# Patient Record
Sex: MALE | HISPANIC OR LATINO | URBAN - METROPOLITAN AREA
[De-identification: names, ages, dates, MRNs, and addresses within clinical notes are randomized per-mention and may not be internally consistent; named-entity substitution may affect disease eponyms.]

---

## 2017-05-19 ENCOUNTER — EMERGENCY (EMERGENCY)
Facility: HOSPITAL | Age: 22
LOS: 0 days | Discharge: HOME | End: 2017-05-19
Attending: EMERGENCY MEDICINE | Admitting: EMERGENCY MEDICINE
Payer: SELF-PAY

## 2017-05-19 VITALS
DIASTOLIC BLOOD PRESSURE: 78 MMHG | RESPIRATION RATE: 18 BRPM | HEART RATE: 78 BPM | HEIGHT: 72 IN | TEMPERATURE: 98 F | WEIGHT: 179.9 LBS | SYSTOLIC BLOOD PRESSURE: 118 MMHG | OXYGEN SATURATION: 100 %

## 2017-05-19 VITALS
SYSTOLIC BLOOD PRESSURE: 120 MMHG | OXYGEN SATURATION: 100 % | HEART RATE: 74 BPM | TEMPERATURE: 98 F | DIASTOLIC BLOOD PRESSURE: 72 MMHG | RESPIRATION RATE: 18 BRPM
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
## 2017-05-19 DIAGNOSIS — S51.812A LACERATION WITHOUT FOREIGN BODY OF LEFT FOREARM, INITIAL ENCOUNTER: ICD-10-CM

## 2017-05-19 PROCEDURE — 99284 EMERGENCY DEPT VISIT MOD MDM: CPT

## 2017-05-19 PROCEDURE — 12001 RPR S/N/AX/GEN/TRNK 2.5CM/<: CPT

## 2017-05-19 NOTE — ED PROVIDER NOTE - MEDICAL DECISION MAKING DETAILS
Phyllis MELENDEZ: CANDE Barrios repaired the laceration on the patient's forearm. No neurological deficits post repair. Patient in no distress. Outpatient follow up and instructions to return if any health concerns provided for patient.

## 2017-05-19 NOTE — ED PROVIDER NOTE - SKIN, MLM
Skin normal color for race, warm. There is a 2 cm linear laceration in the left forearm with no dehiscence and no active bleeding. Bilateral radial and ulnar pulses present (2+). 5/5 strength in flexion and extension of bilateral upper and lower extremities including no neurological or muscular deficit in fine movements of the left hand and fingers. No evidence of tendon injury.

## 2017-05-19 NOTE — ED PROCEDURE NOTE - PROCEDURE ADDITIONAL DETAILS
Left wrist: Volar aspect , Lido 2 % infiltrated, irrigated copiously with saline, sutured with 4-0 prolene # 3 ,bacitracin ointment and DSD applied. Francis NP

## 2017-05-19 NOTE — ED PROVIDER NOTE - NEUROLOGICAL, MLM
Alert and oriented, no focal deficits, no motor or sensory deficits. CNs 2-12 intact. Non ataxic gait. EOMI. AAOx4.

## 2017-05-19 NOTE — ED PROVIDER NOTE - OBJECTIVE STATEMENT
21 year old male with no known pertinent PMH presents BIB EMS with cc of laceration to the left distal forearm after handling shingles. No fever or chills. No melena or hematochezia. No dysuria hematuria or frequency. No recent exotic travel. No IVDU. No ETOH abuse. No visual complaints. No focal neurological deficits. Patient with his friend who wrapped the laceration site.

## 2017-05-19 NOTE — ED PROVIDER NOTE - ATTENDING CONTRIBUTION TO CARE
I Justo Ferguson MD saw and examined this patient by myself. I asked NP Ms. Shanika Barrios to help me with repair of the laceration. I supervised her and agree with her care of the patient.

## 2017-09-26 NOTE — ED ADULT NURSE NOTE - GASTROINTESTINAL WDL
Abdomen soft, nontender, nondistended, bowel sounds present in all 4 quadrants. Unknown if ever smoked